# Patient Record
Sex: FEMALE | Race: OTHER | NOT HISPANIC OR LATINO
[De-identification: names, ages, dates, MRNs, and addresses within clinical notes are randomized per-mention and may not be internally consistent; named-entity substitution may affect disease eponyms.]

---

## 2021-03-18 ENCOUNTER — APPOINTMENT (OUTPATIENT)
Dept: ORTHOPEDIC SURGERY | Facility: CLINIC | Age: 36
End: 2021-03-18
Payer: MEDICAID

## 2021-03-18 VITALS — BODY MASS INDEX: 20.49 KG/M2 | HEIGHT: 64 IN | WEIGHT: 120 LBS

## 2021-03-18 DIAGNOSIS — Z72.3 LACK OF PHYSICAL EXERCISE: ICD-10-CM

## 2021-03-18 DIAGNOSIS — Z78.9 OTHER SPECIFIED HEALTH STATUS: ICD-10-CM

## 2021-03-18 PROBLEM — Z00.00 ENCOUNTER FOR PREVENTIVE HEALTH EXAMINATION: Status: ACTIVE | Noted: 2021-03-18

## 2021-03-18 PROCEDURE — 25600 CLTX DST RDL FX/EPHYS SEP WO: CPT | Mod: LT

## 2021-03-18 PROCEDURE — 99203 OFFICE O/P NEW LOW 30 MIN: CPT | Mod: 57

## 2021-03-19 PROBLEM — Z78.9 SOCIAL ALCOHOL USE: Status: ACTIVE | Noted: 2021-03-18

## 2021-03-19 PROBLEM — Z72.3 DOES NOT EXERCISE: Status: ACTIVE | Noted: 2021-03-18

## 2021-03-19 RX ORDER — LEVONORGESTREL 52 MG/1
INTRAUTERINE DEVICE INTRAUTERINE
Refills: 0 | Status: ACTIVE | COMMUNITY

## 2021-03-19 NOTE — DISCUSSION/SUMMARY
[de-identified] : 35F RHD with L nondisplaced radial styloid fracture. No carpal tunnel symptoms. Pain improving with over the counter medications. Conservative treatment with cast immobilization discussed and performed today. Patient was placed in a well-padded short arm cast. Instructed to be NWB LUE, keep cast dry, and remain in cast for 6 weeks. Patient verbalized understanding of closed treatment in cast immoblization plan. Follow up in 6 weeks for repeat XRs and cast removal. Patient instructed to return earlier if she develops carpal tunnels symptoms or pain out of proportion.

## 2021-03-19 NOTE — PHYSICAL EXAM
[de-identified] : NAD, AAOx3\par Wrist splint removed\par Ecchmoysis radial and volarly, tenderness to palpation at radial styloid\par Firing EPL/FPL/first dorsal interossei\par FROM at elbow, non-tender to palpation\par Negative Tinels/Phalens/Durkins at wrist\par SILT rad/med/uln\par 2+ rad [de-identified] : XR from [facility] reviewed: L non-displaced radial styloid fracture

## 2021-03-19 NOTE — HISTORY OF PRESENT ILLNESS
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 No other trauma. she went to [facility] the next day, diagnosied with L distal radius fracture and placed in sugartong splint. Denies numbnesss/tingling or carpal tunnel symptoms. Pain improving.

## 2021-04-15 ENCOUNTER — RESULT REVIEW (OUTPATIENT)
Age: 36
End: 2021-04-15

## 2021-04-15 ENCOUNTER — OUTPATIENT (OUTPATIENT)
Dept: OUTPATIENT SERVICES | Facility: HOSPITAL | Age: 36
LOS: 1 days | End: 2021-04-15
Payer: MEDICAID

## 2021-04-15 ENCOUNTER — APPOINTMENT (OUTPATIENT)
Dept: ORTHOPEDIC SURGERY | Facility: CLINIC | Age: 36
End: 2021-04-15
Payer: MEDICAID

## 2021-04-15 DIAGNOSIS — S62.102A FRACTURE OF UNSPECIFIED CARPAL BONE, LEFT WRIST, INITIAL ENCOUNTER FOR CLOSED FRACTURE: ICD-10-CM

## 2021-04-15 PROCEDURE — 73110 X-RAY EXAM OF WRIST: CPT

## 2021-04-15 PROCEDURE — 73110 X-RAY EXAM OF WRIST: CPT | Mod: 26,LT

## 2021-04-15 PROCEDURE — 99024 POSTOP FOLLOW-UP VISIT: CPT

## 2021-04-16 PROBLEM — S62.102A LEFT WRIST FRACTURE: Status: ACTIVE | Noted: 2021-03-19

## 2021-04-16 NOTE — HISTORY OF PRESENT ILLNESS
[de-identified] : 36F RHD hx L DRF 3/13 s/p short arm cast 3/18 presenting to clinic for follow up visit. Reports minimal pain. However, denies numbness or tingling. No new trauma or falls.

## 2021-04-16 NOTE — ASSESSMENT
[FreeTextEntry1] : 36F s/p L DRF managed conservatively in short arm cast\par \par Plan:\par Converted from short arm cast to removable brace \par Maintain brace for another 2 weeks \par Pain control as needed\par Ok to return to work after 2 weeks\par  \par

## 2021-04-16 NOTE — PHYSICAL EXAM
[de-identified] : Gen: Well appearing in no acute distress \par L arm in short cast\par Cast clean, dry and intact \par Sensation intact around fingers \par AIN/PIN/U grossly preserved \par Cast removed \par Skin intact [de-identified] : X-ray L wrist with no new fractures or dislocations